# Patient Record
Sex: MALE | Race: WHITE
[De-identification: names, ages, dates, MRNs, and addresses within clinical notes are randomized per-mention and may not be internally consistent; named-entity substitution may affect disease eponyms.]

---

## 2018-05-11 ENCOUNTER — HOSPITAL ENCOUNTER (EMERGENCY)
Dept: HOSPITAL 74 - FER | Age: 78
Discharge: HOME | End: 2018-05-11
Payer: COMMERCIAL

## 2018-05-11 VITALS — SYSTOLIC BLOOD PRESSURE: 145 MMHG | TEMPERATURE: 98.6 F | HEART RATE: 91 BPM | DIASTOLIC BLOOD PRESSURE: 77 MMHG

## 2018-05-11 VITALS — BODY MASS INDEX: 30.2 KG/M2

## 2018-05-11 DIAGNOSIS — E78.5: ICD-10-CM

## 2018-05-11 DIAGNOSIS — M62.838: Primary | ICD-10-CM

## 2018-05-11 DIAGNOSIS — N40.0: ICD-10-CM

## 2018-05-11 DIAGNOSIS — I10: ICD-10-CM

## 2018-05-11 PROCEDURE — 3E0233Z INTRODUCTION OF ANTI-INFLAMMATORY INTO MUSCLE, PERCUTANEOUS APPROACH: ICD-10-PCS

## 2018-05-11 NOTE — PDOC
History of Present Illness





- General


Chief Complaint: Pain


Stated Complaint: NECK PAIN


Time Seen by Provider: 05/11/18 06:15





- History of Present Illness


Initial Comments: 





05/11/18 06:43


This 78-year-old man with a history of HTN/HLD/lumbar herniated disc disease/

BPH presents with a few day history of neck pain.  Patient states that pain is 

worse with movement of his head (especially turning head right and left).  No 

history of trauma.  Patient cannot recall lifting/pushing/pulling any heavy 

weight; no other overuse noted.  He states he awakened with the pain 3 days 

ago.  He has not had any fever or upper respiratory symptoms.  No cough/

shortness of breath/chest pain noted.  Patient was seen by his spinal/pain 

physician 2 days ago; patient is scheduled to return on May 29 for procedure( 

"shot"in lower back for pain).  Patient states that his doctor said that the 

neck pain will also be evaluated and treated at that time.


In the interim, patient states that he will be in [place] with his daughter (

leaving May 14)


Patient is prescribed meloxicam for his arthritis pain; he states he has not 

taken this in several days because the bottle of medications are in his home in 

the [place] (he is currently staying in his daughter's home here in [place]).

  He will not be able to retrieve his bottle of meloxicam until tomorrow or the 

following day.

















Past History





- Past Medical History


Allergies/Adverse Reactions: 


 Allergies











Allergy/AdvReac Type Severity Reaction Status Date / Time


 


No Known Allergies Allergy   Unverified 05/11/18 06:17











Home Medications: 


Ambulatory Orders





Amlodipine Besylate [Norvasc -] 10 mg PO DAILY 05/11/18 


Atorvastatin Ca [Lipitor] 10 mg PO HS 05/11/18 


Finasteride 5 mg PO DAILY 05/11/18 


Fluticasone Prop 0.05% Nasal [Flonase -] 1 spray NS DAILY 05/11/18 


Meloxicam 15 mg PO DAILY 05/11/18 


Tamsulosin HCl [Flomax] 0.4 mg PO DAILY 05/11/18 











**Review of Systems





- Review of Systems


Able to Perform ROS?: Yes


Comments:: 





12 point review of systems is negative except for what is noted in the history 

of present illness








*Physical Exam





- Physical Exam


Comments: 





GENERAL: Elderly man,  mostly Macanese speaking but communicating adequately, in 

no acute distress


HEAD: Normal with no signs of trauma.


EYES: PERRLA, EOMI, sclera anicteric, conjunctiva clear.


ENT: Ears normal, nares patent, oropharynx clear without exudates.  Moist 

mucous membranes.


NECK:  Supple without lymphadenopathy, JVD, or masses.  No bruits


             No meningismus; mild tenderness of bilateral trapezius muscles; 

mild tenderness of bilateral sternocleidomastoid muscles


             Pain reproduced with especially with lateral rotation of head


LUNGS: Breath sounds equal, clear to auscultation bilaterally.  No wheezes, and 

no crackles.


HEART:Regular rate and rhythm, normal S1 and S2 without murmur, rub or gallop.


ABDOMEN:.normal bowel sounds  No guarding,tenderness or rebound.No masses No 

distention. 


EXTREMITIES: Normal range of motion, no edema.  No clubbing or cyanosis. No 

erythema, or tenderness.


NEUROLOGICAL: Cranial nerves II through XII grossly intact.  Normal speech.  No 

focal 


neurological deficits. 


MUSCULOSKELETAL:  Back non-tender to palpation, no CVA tenderness


SKIN: Warm, Dry, normal turgor, no rashes or lesions noted.








Medical Decision Making





- Medical Decision Making





This elderly man presents with a few day history of pain with movement of his 

head, especially laterally; trapezius and sternocleidomastoid muscles are 

mildly tender on palpation.  There is no meningismus or pain on palpation of 

vertebral bodies.  No history of trauma or overuse noted.  The patient has a 

history of arthritis and lower back herniated disc disease.  He is currently 

under treatment of the lumbar spine by spine/pain physician.  He is scheduled 

for follow-up with this physician in approximately 2 weeks.  Patient is 

prescribed meloxicam daily for his arthritis; he has not been able to take this 

for several days because the bottle of meloxicam is in his other home in the 

[place].  He will not be able to get to this for 1-2 days(in [place] his daughter 

is currently away for this period of time).





Patient given Toradol 60 mg IM.  Patient did not want a new refill of his 

meloxicam.  He is instead going to wait until he gets to his original bottle.  

He states that he will take ibuprofen (OTC) if he cannot get to the meloxicam 

until tomorrow.


Meanwhile, patient should use local warmth to his neck muscles; soft cervical 

collar also placed and he should use this as needed.  To return to the ER if he 

Has persistent severe pain or any new symptoms.  Otherwise, he should follow-up 

with his doctor as scheduled.

















*DC/Admit/Observation/Transfer


Diagnosis at time of Disposition: 


 Muscle spasms of neck








- Discharge Dispostion


Disposition: HOME


Condition at time of disposition: Stable





- Referrals


Referrals: 


ON STAFF,NOT [Primary Care Provider] - 





- Patient Instructions


Printed Discharge Instructions:  DI for Neck Pain


Additional Instructions: 


local warm compresses to neck muscles


soft collar as needed


continue meloxicam  as prescribed tomorrow


take all other medications as prescribed


followup with your spine/pain doctor on May 29th as scheduled


return to ER or see your doctor earlier if pain persists








- Post Discharge Activity

## 2022-02-23 ENCOUNTER — TRANSCRIPTION ENCOUNTER (OUTPATIENT)
Age: 82
End: 2022-02-23

## 2022-08-18 PROBLEM — Z00.00 ENCOUNTER FOR PREVENTIVE HEALTH EXAMINATION: Status: ACTIVE | Noted: 2022-08-18

## 2022-09-01 ENCOUNTER — APPOINTMENT (OUTPATIENT)
Dept: NEUROLOGY | Facility: CLINIC | Age: 82
End: 2022-09-01

## 2022-12-06 ENCOUNTER — NON-APPOINTMENT (OUTPATIENT)
Age: 82
End: 2022-12-06

## 2024-01-22 ENCOUNTER — NON-APPOINTMENT (OUTPATIENT)
Age: 84
End: 2024-01-22